# Patient Record
Sex: MALE | Race: WHITE | NOT HISPANIC OR LATINO
[De-identification: names, ages, dates, MRNs, and addresses within clinical notes are randomized per-mention and may not be internally consistent; named-entity substitution may affect disease eponyms.]

---

## 2019-05-21 PROBLEM — Z00.00 ENCOUNTER FOR PREVENTIVE HEALTH EXAMINATION: Status: ACTIVE | Noted: 2019-05-21

## 2019-05-23 ENCOUNTER — APPOINTMENT (OUTPATIENT)
Dept: PHYSICAL MEDICINE AND REHAB | Facility: CLINIC | Age: 44
End: 2019-05-23
Payer: COMMERCIAL

## 2019-05-23 VITALS — WEIGHT: 210 LBS | BODY MASS INDEX: 31.83 KG/M2 | HEART RATE: 73 BPM | RESPIRATION RATE: 15 BRPM | HEIGHT: 68 IN

## 2019-05-23 DIAGNOSIS — Z87.39 PERSONAL HISTORY OF OTHER DISEASES OF THE MUSCULOSKELETAL SYSTEM AND CONNECTIVE TISSUE: ICD-10-CM

## 2019-05-23 DIAGNOSIS — Z80.42 FAMILY HISTORY OF MALIGNANT NEOPLASM OF PROSTATE: ICD-10-CM

## 2019-05-23 DIAGNOSIS — Z82.61 FAMILY HISTORY OF ARTHRITIS: ICD-10-CM

## 2019-05-23 DIAGNOSIS — M54.12 RADICULOPATHY, CERVICAL REGION: ICD-10-CM

## 2019-05-23 DIAGNOSIS — Z80.9 FAMILY HISTORY OF MALIGNANT NEOPLASM, UNSPECIFIED: ICD-10-CM

## 2019-05-23 PROCEDURE — 99204 OFFICE O/P NEW MOD 45 MIN: CPT

## 2019-05-23 RX ORDER — ATORVASTATIN CALCIUM 20 MG/1
20 TABLET, FILM COATED ORAL
Refills: 0 | Status: ACTIVE | COMMUNITY

## 2019-05-23 RX ORDER — ESCITALOPRAM OXALATE 10 MG/1
10 TABLET, FILM COATED ORAL
Refills: 0 | Status: ACTIVE | COMMUNITY

## 2019-05-23 RX ORDER — BACILLUS COAGULANS/INULIN 1B-250 MG
CAPSULE ORAL
Refills: 0 | Status: ACTIVE | COMMUNITY

## 2023-02-05 ENCOUNTER — APPOINTMENT (OUTPATIENT)
Dept: ORTHOPEDIC SURGERY | Facility: CLINIC | Age: 48
End: 2023-02-05
Payer: COMMERCIAL

## 2023-02-05 VITALS — HEIGHT: 68 IN | BODY MASS INDEX: 31.83 KG/M2 | WEIGHT: 210 LBS

## 2023-02-05 PROCEDURE — 73080 X-RAY EXAM OF ELBOW: CPT | Mod: LT

## 2023-02-05 PROCEDURE — 99203 OFFICE O/P NEW LOW 30 MIN: CPT

## 2023-02-05 RX ORDER — MELOXICAM 15 MG/1
15 TABLET ORAL DAILY
Qty: 30 | Refills: 1 | Status: ACTIVE | COMMUNITY
Start: 2023-02-05 | End: 1900-01-01

## 2023-02-05 RX ORDER — LISINOPRIL 30 MG/1
TABLET ORAL
Refills: 0 | Status: ACTIVE | COMMUNITY

## 2023-02-05 RX ORDER — METHYLPREDNISOLONE 4 MG/1
4 TABLET ORAL
Qty: 1 | Refills: 0 | Status: ACTIVE | COMMUNITY
Start: 2023-02-05 | End: 1900-01-01

## 2023-02-05 NOTE — HISTORY OF PRESENT ILLNESS
[9] : 9 [1] : 2 [Sharp] : sharp [de-identified] : 46 y/o RHD M with atraumatic L elbow pain x weeks with worsening when working with hands. pt felt more pain after boxing yesterday. denies numbness/tingling. 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

## 2023-02-05 NOTE — ASSESSMENT
[FreeTextEntry1] : A/P L lateral epicondylitis\par - tennis elbow brace\par - wrist brace\par - activity modification\par - NSAIDs\par - MDP\par - injection discussed\par - natural disease progression discussed\par - f/u 1 week with hand\par

## 2023-02-05 NOTE — IMAGING
[de-identified] : PE L elbow: skin intact, mild swelling, FROM, +tenderness over lateral epicondyle, no medial side pain, pain with resisted WE and supination, NVI distally\par  [Left] : left elbow [There are no fractures, subluxations or dislocations. No significant abnormalities are seen] : There are no fractures, subluxations or dislocations. No significant abnormalities are seen

## 2023-02-10 ENCOUNTER — APPOINTMENT (OUTPATIENT)
Dept: ORTHOPEDIC SURGERY | Facility: CLINIC | Age: 48
End: 2023-02-10
Payer: COMMERCIAL

## 2023-02-10 VITALS — BODY MASS INDEX: 31.83 KG/M2 | HEIGHT: 68 IN | WEIGHT: 210 LBS

## 2023-02-10 DIAGNOSIS — M77.12 LATERAL EPICONDYLITIS, LEFT ELBOW: ICD-10-CM

## 2023-02-10 PROCEDURE — 99244 OFF/OP CNSLTJ NEW/EST MOD 40: CPT | Mod: 25

## 2023-02-10 PROCEDURE — 20551 NJX 1 TENDON ORIGIN/INSJ: CPT

## 2023-02-10 PROCEDURE — J3490M: CUSTOM | Mod: LT

## 2023-02-10 NOTE — ASSESSMENT
[FreeTextEntry1] : L Lateral epicondyle injection was performed because of pain inflammation\par Anesthesia: ethyl chloride sprayed topically\par Celestone: An injection of Celestone 2 cc\par Lidocaine: An injection of Lidocaine 1% 2 cc\par Marcaine: An injection of Marcaine 0.5% 2 cc\par \par Patient has tried OTC's including aspirin, Ibuprofen, Aleve etc or prescription NSAIDS, and/or exercises at home and/ or\par physical therapy without satisfactory response.\par After verbal consent using sterile preparation and technique. The risks, benefits, and alternatives to cortisone injection\par were explained in full to the patient. Risks outlined include but are not limited to infection, sepsis, bleeding, scarring, skin\par discoloration, temporary increase in pain, syncopal episode, failure to resolve symptoms, allergic reaction, symptom\par recurrence, and elevation of blood sugar in diabetics. Patient understood the risks. All questions were answered. After\par discussion of options, patient requested an injection. Oral informed consent was obtained and sterile prep was done of the\par injection site. Sterile technique was utilized for the procedure including the preparation of the solutions used for the\par injection. Patient tolerated the procedure well. Advised to ice the injection site this evening.\par Prep with betadine locally to site. Sterile technique used

## 2023-02-10 NOTE — PHYSICAL EXAM
[de-identified] : L elbow:\par mild swelling\par Tender lateral epicondyle\par Pain with ROM\par Pain with forced wrist extension \par \par Xrays neg

## 2023-02-10 NOTE — HISTORY OF PRESENT ILLNESS
[7] : 7 [6] : 6 [Dull/Aching] : dull/aching [Meds] : meds [Ice] : ice [de-identified] : L elbow pain from hitting heavy bag\par MDP has helped slightly  [] : no [FreeTextEntry1] : L elbow [FreeTextEntry3] : jan 2023 [FreeTextEntry5] : he was hitting punching bag and developed pain overnight [de-identified] : activity  [de-identified] : 2/5/23 [de-identified] : [facility] [facility] [de-identified] : xr

## 2023-04-03 ENCOUNTER — APPOINTMENT (OUTPATIENT)
Dept: ORTHOPEDIC SURGERY | Facility: CLINIC | Age: 48
End: 2023-04-03
Payer: COMMERCIAL

## 2023-04-03 ENCOUNTER — TRANSCRIPTION ENCOUNTER (OUTPATIENT)
Age: 48
End: 2023-04-03

## 2023-04-03 PROCEDURE — 99203 OFFICE O/P NEW LOW 30 MIN: CPT

## 2023-04-03 PROCEDURE — 73610 X-RAY EXAM OF ANKLE: CPT | Mod: RT

## 2023-04-03 NOTE — DISCUSSION/SUMMARY
[de-identified] : The underlying pathophysiology was reviewed with the patient. XR films were reviewed with the patient. Discussed at length the nature of the patient’s condition. The right ankle symptoms appear secondary to a trimalleolar fracture.\par \par At this time, given the nature of the injury as documented above, I recommended operative management of ORIF.\par The patient wishes to proceed with ORIF right ankle, trimalleolar fracture at this time. The risks and benefits were reviewed with the patient. All of his questions were answered. He will meet with our surgical scheduler and be scheduled for surgery possibly tomorrow on 4/4/23.\par \par In the interim, he will remain in the trilaminar splint for support and protection. He was instructed on elevation to reduce edema and on leg lift exercises to maintain quadriceps bulk. I recommended use of Ibuprofen for pain rather than Oxycodone. I also advised baby aspirin 81mg, BID for DVT prophylaxis. \par \par RX: Knee Scooter.

## 2023-04-03 NOTE — HISTORY OF PRESENT ILLNESS
[de-identified] : Pt is a 46 y/o male with right ankle fracture.  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  He stepped out of the truck and landed hard on his right foot.  He heard a crack and felt pain immediately.  He fell to the ground.  He developed swelling and ecchymosis.  He went to [facility] where xrays revealed a right ankle fracture.  He was advised to follow up with a specialist for surgical consultation.  He is currently taking Oxycodone for pain.  He notes he was told not to take Ibuprofen.

## 2023-04-03 NOTE — ADDENDUM
[FreeTextEntry1] : I, Gina Diggs wrote this note acting as a scribe for Dr. Alton Brown on Apr 03, 2023.

## 2023-04-03 NOTE — PHYSICAL EXAM
[de-identified] : Patient is WDWN, alert, and in no acute distress. Breathing is unlabored. He is grossly oriented to person, place, and time.\par \par He is accompanied by his wife today.\par \par Right Ankle:\par He presents to the office NWB, with the assistance of crutches.\par Trilaminar splint in place, which was removed for exam.\par No evidence of blistering or skin breakdown.\par Moderate edema noted to the dorsum of the foot and diffusely through the ankle. \par Ankle ROM was not accessed given injury and pain.\par Full arc of motion to the toes, with intact sensation. [de-identified] : AP, lateral and oblique views of the RIGHT ankle were obtained today and revealed a displaced trimalleolar fracture.

## 2023-04-03 NOTE — END OF VISIT
[FreeTextEntry3] : All medical record entries made by the Scribe were at my,  Dr. Alton Brown MD., direction and personally dictated by me on 04/03/2023. I have personally reviewed the chart and agree that the record accurately reflects my personal performance of the history, physical exam, assessment and plan.

## 2023-04-04 ENCOUNTER — TRANSCRIPTION ENCOUNTER (OUTPATIENT)
Age: 48
End: 2023-04-04

## 2023-04-04 ENCOUNTER — INPATIENT (INPATIENT)
Facility: HOSPITAL | Age: 48
LOS: 0 days | Discharge: HOME | DRG: 494 | End: 2023-04-04
Attending: INTERNAL MEDICINE | Admitting: INTERNAL MEDICINE
Payer: COMMERCIAL

## 2023-04-04 VITALS
OXYGEN SATURATION: 98 % | SYSTOLIC BLOOD PRESSURE: 146 MMHG | HEIGHT: 68 IN | WEIGHT: 220.02 LBS | TEMPERATURE: 99 F | DIASTOLIC BLOOD PRESSURE: 90 MMHG | HEART RATE: 78 BPM | RESPIRATION RATE: 16 BRPM

## 2023-04-04 VITALS
RESPIRATION RATE: 16 BRPM | SYSTOLIC BLOOD PRESSURE: 115 MMHG | TEMPERATURE: 98 F | HEART RATE: 85 BPM | DIASTOLIC BLOOD PRESSURE: 70 MMHG

## 2023-04-04 DIAGNOSIS — Z98.890 OTHER SPECIFIED POSTPROCEDURAL STATES: Chronic | ICD-10-CM

## 2023-04-04 DIAGNOSIS — S82.899A OTHER FRACTURE OF UNSPECIFIED LOWER LEG, INITIAL ENCOUNTER FOR CLOSED FRACTURE: ICD-10-CM

## 2023-04-04 LAB
ALBUMIN SERPL ELPH-MCNC: 3.9 G/DL — SIGNIFICANT CHANGE UP (ref 3.3–5)
ALP SERPL-CCNC: 62 U/L — SIGNIFICANT CHANGE UP (ref 40–120)
ALT FLD-CCNC: 49 U/L — HIGH (ref 10–45)
ANION GAP SERPL CALC-SCNC: 7 MMOL/L — SIGNIFICANT CHANGE UP (ref 5–17)
APTT BLD: 30.7 SEC — SIGNIFICANT CHANGE UP (ref 27.5–35.5)
AST SERPL-CCNC: 25 U/L — SIGNIFICANT CHANGE UP (ref 10–40)
BASOPHILS # BLD AUTO: 0.03 K/UL — SIGNIFICANT CHANGE UP (ref 0–0.2)
BASOPHILS NFR BLD AUTO: 0.4 % — SIGNIFICANT CHANGE UP (ref 0–2)
BILIRUB SERPL-MCNC: 0.5 MG/DL — SIGNIFICANT CHANGE UP (ref 0.2–1.2)
BLD GP AB SCN SERPL QL: SIGNIFICANT CHANGE UP
BUN SERPL-MCNC: 12 MG/DL — SIGNIFICANT CHANGE UP (ref 7–23)
CALCIUM SERPL-MCNC: 9.4 MG/DL — SIGNIFICANT CHANGE UP (ref 8.4–10.5)
CHLORIDE SERPL-SCNC: 103 MMOL/L — SIGNIFICANT CHANGE UP (ref 96–108)
CO2 SERPL-SCNC: 30 MMOL/L — SIGNIFICANT CHANGE UP (ref 22–31)
CREAT SERPL-MCNC: 0.93 MG/DL — SIGNIFICANT CHANGE UP (ref 0.5–1.3)
EGFR: 101 ML/MIN/1.73M2 — SIGNIFICANT CHANGE UP
EOSINOPHIL # BLD AUTO: 0.08 K/UL — SIGNIFICANT CHANGE UP (ref 0–0.5)
EOSINOPHIL NFR BLD AUTO: 1.1 % — SIGNIFICANT CHANGE UP (ref 0–6)
GLUCOSE SERPL-MCNC: 103 MG/DL — HIGH (ref 70–99)
HCT VFR BLD CALC: 41.4 % — SIGNIFICANT CHANGE UP (ref 39–50)
HGB BLD-MCNC: 14.4 G/DL — SIGNIFICANT CHANGE UP (ref 13–17)
IMM GRANULOCYTES NFR BLD AUTO: 0.4 % — SIGNIFICANT CHANGE UP (ref 0–0.9)
INR BLD: 1.07 RATIO — SIGNIFICANT CHANGE UP (ref 0.88–1.16)
LYMPHOCYTES # BLD AUTO: 1.81 K/UL — SIGNIFICANT CHANGE UP (ref 1–3.3)
LYMPHOCYTES # BLD AUTO: 24.3 % — SIGNIFICANT CHANGE UP (ref 13–44)
MCHC RBC-ENTMCNC: 30.8 PG — SIGNIFICANT CHANGE UP (ref 27–34)
MCHC RBC-ENTMCNC: 34.8 GM/DL — SIGNIFICANT CHANGE UP (ref 32–36)
MCV RBC AUTO: 88.5 FL — SIGNIFICANT CHANGE UP (ref 80–100)
MONOCYTES # BLD AUTO: 0.78 K/UL — SIGNIFICANT CHANGE UP (ref 0–0.9)
MONOCYTES NFR BLD AUTO: 10.5 % — SIGNIFICANT CHANGE UP (ref 2–14)
NEUTROPHILS # BLD AUTO: 4.73 K/UL — SIGNIFICANT CHANGE UP (ref 1.8–7.4)
NEUTROPHILS NFR BLD AUTO: 63.3 % — SIGNIFICANT CHANGE UP (ref 43–77)
NRBC # BLD: 0 /100 WBCS — SIGNIFICANT CHANGE UP (ref 0–0)
PLATELET # BLD AUTO: 233 K/UL — SIGNIFICANT CHANGE UP (ref 150–400)
POTASSIUM SERPL-MCNC: 4.5 MMOL/L — SIGNIFICANT CHANGE UP (ref 3.5–5.3)
POTASSIUM SERPL-SCNC: 4.5 MMOL/L — SIGNIFICANT CHANGE UP (ref 3.5–5.3)
PROT SERPL-MCNC: 7.4 G/DL — SIGNIFICANT CHANGE UP (ref 6–8.3)
PROTHROM AB SERPL-ACNC: 12.4 SEC — SIGNIFICANT CHANGE UP (ref 10.5–13.4)
RBC # BLD: 4.68 M/UL — SIGNIFICANT CHANGE UP (ref 4.2–5.8)
RBC # FLD: 12.3 % — SIGNIFICANT CHANGE UP (ref 10.3–14.5)
SARS-COV-2 RNA SPEC QL NAA+PROBE: SIGNIFICANT CHANGE UP
SODIUM SERPL-SCNC: 140 MMOL/L — SIGNIFICANT CHANGE UP (ref 135–145)
WBC # BLD: 7.46 K/UL — SIGNIFICANT CHANGE UP (ref 3.8–10.5)
WBC # FLD AUTO: 7.46 K/UL — SIGNIFICANT CHANGE UP (ref 3.8–10.5)

## 2023-04-04 PROCEDURE — 93010 ELECTROCARDIOGRAM REPORT: CPT

## 2023-04-04 PROCEDURE — C1713: CPT

## 2023-04-04 PROCEDURE — 71045 X-RAY EXAM CHEST 1 VIEW: CPT | Mod: 26

## 2023-04-04 PROCEDURE — C1769: CPT

## 2023-04-04 PROCEDURE — 86850 RBC ANTIBODY SCREEN: CPT

## 2023-04-04 PROCEDURE — 85610 PROTHROMBIN TIME: CPT

## 2023-04-04 PROCEDURE — ZZZZZ: CPT

## 2023-04-04 PROCEDURE — 71045 X-RAY EXAM CHEST 1 VIEW: CPT

## 2023-04-04 PROCEDURE — 99285 EMERGENCY DEPT VISIT HI MDM: CPT | Mod: 25

## 2023-04-04 PROCEDURE — 76000 FLUOROSCOPY <1 HR PHYS/QHP: CPT

## 2023-04-04 PROCEDURE — 99285 EMERGENCY DEPT VISIT HI MDM: CPT

## 2023-04-04 PROCEDURE — 36415 COLL VENOUS BLD VENIPUNCTURE: CPT

## 2023-04-04 PROCEDURE — 86901 BLOOD TYPING SEROLOGIC RH(D): CPT

## 2023-04-04 PROCEDURE — 99223 1ST HOSP IP/OBS HIGH 75: CPT

## 2023-04-04 PROCEDURE — 86900 BLOOD TYPING SEROLOGIC ABO: CPT

## 2023-04-04 PROCEDURE — 85025 COMPLETE CBC W/AUTO DIFF WBC: CPT

## 2023-04-04 PROCEDURE — 93005 ELECTROCARDIOGRAM TRACING: CPT

## 2023-04-04 PROCEDURE — 27823 TREATMENT OF ANKLE FRACTURE: CPT | Mod: RT

## 2023-04-04 PROCEDURE — 85730 THROMBOPLASTIN TIME PARTIAL: CPT

## 2023-04-04 PROCEDURE — 87635 SARS-COV-2 COVID-19 AMP PRB: CPT

## 2023-04-04 PROCEDURE — 80053 COMPREHEN METABOLIC PANEL: CPT

## 2023-04-04 DEVICE — SCREW CORT S-T 3.5X14MM: Type: IMPLANTABLE DEVICE | Status: FUNCTIONAL

## 2023-04-04 DEVICE — SCREW CORT S-T 3.5X16MM: Type: IMPLANTABLE DEVICE | Status: FUNCTIONAL

## 2023-04-04 DEVICE — SCREW CORT S-T 3.5X30MM: Type: IMPLANTABLE DEVICE | Status: FUNCTIONAL

## 2023-04-04 DEVICE — SCREW CORT S-T 3.5X40MM: Type: IMPLANTABLE DEVICE | Status: FUNCTIONAL

## 2023-04-04 DEVICE — SCREW LOKG SLF-TPNG W/ STARDRIVE RECESS 3.5X20MM: Type: IMPLANTABLE DEVICE | Status: FUNCTIONAL

## 2023-04-04 DEVICE — SCREW CORT S-T 3.5X34MM: Type: IMPLANTABLE DEVICE | Status: FUNCTIONAL

## 2023-04-04 DEVICE — PLATE 6 HOLE: Type: IMPLANTABLE DEVICE | Status: FUNCTIONAL

## 2023-04-04 DEVICE — GUIDEWIRE THREADED 1.25 X 150MM: Type: IMPLANTABLE DEVICE | Status: FUNCTIONAL

## 2023-04-04 DEVICE — SCREW CORTEX 3.5X45MM: Type: IMPLANTABLE DEVICE | Status: FUNCTIONAL

## 2023-04-04 DEVICE — IMPLANTABLE DEVICE: Type: IMPLANTABLE DEVICE | Status: FUNCTIONAL

## 2023-04-04 DEVICE — SCREW CORT S-T 3.5X32MM: Type: IMPLANTABLE DEVICE | Status: FUNCTIONAL

## 2023-04-04 DEVICE — PLATE 1/3 TUB LCP W/COLLAR 5H 57MM: Type: IMPLANTABLE DEVICE | Status: FUNCTIONAL

## 2023-04-04 DEVICE — SCREW CORT S-T 3.5X18MM: Type: IMPLANTABLE DEVICE | Status: FUNCTIONAL

## 2023-04-04 RX ORDER — SODIUM CHLORIDE 9 MG/ML
1000 INJECTION, SOLUTION INTRAVENOUS
Refills: 0 | Status: DISCONTINUED | OUTPATIENT
Start: 2023-04-04 | End: 2023-04-04

## 2023-04-04 RX ORDER — OXYCODONE AND ACETAMINOPHEN 5; 325 MG/1; MG/1
2 TABLET ORAL EVERY 6 HOURS
Refills: 0 | Status: DISCONTINUED | OUTPATIENT
Start: 2023-04-04 | End: 2023-04-04

## 2023-04-04 RX ORDER — PANTOPRAZOLE SODIUM 20 MG/1
40 TABLET, DELAYED RELEASE ORAL
Refills: 0 | Status: DISCONTINUED | OUTPATIENT
Start: 2023-04-04 | End: 2023-04-04

## 2023-04-04 RX ORDER — ONDANSETRON 8 MG/1
4 TABLET, FILM COATED ORAL ONCE
Refills: 0 | Status: DISCONTINUED | OUTPATIENT
Start: 2023-04-04 | End: 2023-04-04

## 2023-04-04 RX ORDER — ASPIRIN/CALCIUM CARB/MAGNESIUM 324 MG
1 TABLET ORAL
Refills: 0 | DISCHARGE

## 2023-04-04 RX ORDER — ASPIRIN/CALCIUM CARB/MAGNESIUM 324 MG
81 TABLET ORAL DAILY
Refills: 0 | Status: CANCELLED | OUTPATIENT
Start: 2023-04-05 | End: 2023-04-04

## 2023-04-04 RX ORDER — OXYCODONE HYDROCHLORIDE 5 MG/1
1 TABLET ORAL
Qty: 20 | Refills: 0
Start: 2023-04-04

## 2023-04-04 RX ORDER — ASPIRIN/CALCIUM CARB/MAGNESIUM 324 MG
1 TABLET ORAL
Qty: 84 | Refills: 0
Start: 2023-04-04 | End: 2023-05-15

## 2023-04-04 RX ORDER — ATORVASTATIN CALCIUM 80 MG/1
20 TABLET, FILM COATED ORAL AT BEDTIME
Refills: 0 | Status: DISCONTINUED | OUTPATIENT
Start: 2023-04-04 | End: 2023-04-04

## 2023-04-04 RX ORDER — LISINOPRIL 2.5 MG/1
5 TABLET ORAL DAILY
Refills: 0 | Status: CANCELLED | OUTPATIENT
Start: 2023-04-05 | End: 2023-04-04

## 2023-04-04 RX ORDER — OXYCODONE AND ACETAMINOPHEN 5; 325 MG/1; MG/1
1 TABLET ORAL EVERY 4 HOURS
Refills: 0 | Status: DISCONTINUED | OUTPATIENT
Start: 2023-04-04 | End: 2023-04-04

## 2023-04-04 RX ORDER — ESCITALOPRAM OXALATE 10 MG/1
20 TABLET, FILM COATED ORAL DAILY
Refills: 0 | Status: CANCELLED | OUTPATIENT
Start: 2023-04-05 | End: 2023-04-04

## 2023-04-04 RX ORDER — HYDROMORPHONE HYDROCHLORIDE 2 MG/ML
0.5 INJECTION INTRAMUSCULAR; INTRAVENOUS; SUBCUTANEOUS
Refills: 0 | Status: DISCONTINUED | OUTPATIENT
Start: 2023-04-04 | End: 2023-04-04

## 2023-04-04 NOTE — DISCHARGE NOTE PROVIDER - HOSPITAL COURSE
47 yom pmh of HTN, HLD, Anxiety, GERD, JOHANNA non-complaints with  CPAP, was his usual states of health until Sunday when he fall accidentally from a sports trailer  while stepping down to floor. Since then he had right leg pain. found to have right tibia fracture. Pt underwent ORIF of right ankle fx, tolerated procedure well. Pt to go home with nonweight bearing status on Right lower extremity. Case discussed with attending and deemed stable to be discharged home.

## 2023-04-04 NOTE — BRIEF OPERATIVE NOTE - NSICDXBRIEFPROCEDURE_GEN_ALL_CORE_FT
PROCEDURES:  Open reduction and internal fixation (ORIF) of ankle with fusion 04-Apr-2023 16:38:56 right ankle Soham Cervantes

## 2023-04-04 NOTE — ED ADULT NURSE NOTE - NSIMPLEMENTINTERV_GEN_ALL_ED
Implemented All Universal Safety Interventions:  [place] to call system. Call bell, personal items and telephone within reach. Instruct patient to call for assistance. Room bathroom lighting operational. Non-slip footwear when patient is off stretcher. Physically safe environment: no spills, clutter or unnecessary equipment. Stretcher in lowest position, wheels locked, appropriate side rails in place.

## 2023-04-04 NOTE — DISCHARGE NOTE PROVIDER - CARE PROVIDER_API CALL
Alton Brown)  Orthopaedic Surgery  [address]  [place] [address]  Phone: [phone]  Fax: [phone]  Follow Up Time: 2 weeks

## 2023-04-04 NOTE — DISCHARGE NOTE NURSING/CASE MANAGEMENT/SOCIAL WORK - PATIENT PORTAL LINK FT
You can access the FollowMyHealth Patient Portal offered by [facility] by registering at the following website: [email or web address]. By joining [facility]’s FollowMyHealth portal, you will also be able to view your health information using other applications (apps) compatible with our system.

## 2023-04-04 NOTE — ED ADULT NURSE NOTE - OBJECTIVE STATEMENT
Assumed pt care for a 47 yr old male alert and oriented x4 complaining of an injury on Sunday. As per pt here today for pre op for surgery. Pt denies any further complaints. Awaiting further disposition.

## 2023-04-04 NOTE — DISCHARGE NOTE PROVIDER - NSDCCPTREATMENT_GEN_ALL_CORE_FT
PRINCIPAL PROCEDURE  Procedure: Open reduction and internal fixation (ORIF) of ankle with fusion  Findings and Treatment: right ankle

## 2023-04-04 NOTE — H&P ADULT - HISTORY OF PRESENT ILLNESS
47 yrs old with PMHX of HTN, HLD, Anxiety, GERD, JOHANNA non-complaints with  CPAP, was his usual states of health until Sunday when he fall accidentally from a sports trailer  while stepping down to floor. Since then he had right leg pain. found to have right tibia fracture. seen by  Dr. cMknight. suggested ORIF today. seen by PCP yesterday and got clearance for surgery. currently RLE 5/10 controlled with meds. no other complaints, denied fever/chills/CP/SOB/cough/palpitation/dizziness/abdominal pian/nausea/vomiting/diarrhoea/constipation/dysuria/leg or calf pain/headaches.all other ROS neg. seen by ortho. scheduled for OR at 1 pm

## 2023-04-04 NOTE — H&P ADULT - ASSESSMENT
patient is medically optimized for OR today. full cx to follow   47 yrs old with PMHX of HTN, HLD, Anxiety, GERD, JOHANNA non-complaints with  CPAP, was his usual states of health until Sunday when he fall accidentally from a sports trailer  while stepping down to floor. Since then he had right leg pain. found to have right tibia fracture. seen by  Dr. Mcknight. suggested ORIF today. seen by PCP yesterday and got clearance for surgery. currently RLE 5/10 controlled with meds. no other complaints, denied fever/chills/CP/SOB/cough/palpitation/dizziness/abdominal pian/nausea/vomiting/diarrhoea/constipation/dysuria/leg or calf pain/headaches.all other ROS neg. seen by ortho. scheduled for OR at 1 pm    Accidental fall on sunday  Right proximal tibia fracture    - admit to med/surg  - NPO  - IVF  - for ORIF today. medically optimized. can undergo planned procedure little more than average risk for his h/o GERD, JOHANNA non complaints with CPAP. he has h/o HTN. no h/o DM/CAD/CVA/CKD. FC >4 METS. EKG NSR. cardiac risk low   - Pain control, DVT-P ,    HTN  - c/w Lisinopril 5 mg daily  - DASH once PO    HLD  - c/w lipitor 20  - A1c    Anxiety  - c/w lexapro 20  - mood is currently stable  - no SI    GERD:  - protonix    JOHANNA  - non-complaints with CPAP.   - counselled  - patient does not want to use CPAP    patient will inform his wife    informed  Carmel Fuentes [ ortho] . d/w  [  MD]

## 2023-04-04 NOTE — CONSULT NOTE ADULT - SUBJECTIVE AND OBJECTIVE BOX
HPI:   47 yrs old with PMHX of HTN, HLD, Anxiety, GERD, JOHANNA non-complaints with  CPAP, was his usual states of health until Sunday when he fall accidentally from a sports trailer  while stepping down to floor. Since then he had right leg pain. found to have right tibia fracture. seen by  Dr. Brown. suggested ORIF today. seen by PCP yesterday and got clearance for surgery. currently RLE 5/10 controlled with meds. no other complaints, denied fever/chills/CP/SOB/cough/palpitation/dizziness/abdominal pian/nausea/vomiting/diarrhoea/constipation/dysuria/leg or calf pain/headaches.all other ROS neg. seen by ortho. scheduled for OR at 1 pm (04 Apr 2023 12:23)    The patient was admitted and discussed with Dr Brown.   History of fall as above.   patient is in right leg splint .   He does c/o of pain but it is manageable with pain meds.   He denies any CP or SOB.   Procedure explained to patient and he will have ORIF right ankle this afternoon by Dr Brown.      Vital Signs Last 24 Hrs  T(C): 37.1 (04 Apr 2023 08:31), Max: 37.1 (04 Apr 2023 08:31)  T(F): 98.7 (04 Apr 2023 08:31), Max: 98.7 (04 Apr 2023 08:31)  HR: 78 (04 Apr 2023 13:30) (78 - 78)  BP: 146/90 (04 Apr 2023 13:30) (146/90 - 146/90)  RR: 16 (04 Apr 2023 13:30) (16 - 16)  SpO2: 98% (04 Apr 2023 08:31) (98% - 98%)    Parameters below as of 04 Apr 2023 08:31  Patient On (Oxygen Delivery Method): room air    PAST MEDICAL & SURGICAL HISTORY:  HTN (hypertension)  HLD (hyperlipidemia)  Mild anxiety  Chronic GERD  JOHANNA (obstructive sleep apnea)  No significant past surgical history    MEDICATIONS  (STANDING):    MEDICATIONS  (PRN):    Allergies:  NKA    PE:    alert and oriented X 3  Lungs:  CTA   Cor:  RR s1S2  abd:   soft, non tender , +BS , voiding   Ext:   Right Tri mal fx in splint , toes warm, good CR  left leg wnl                          14.4   7.46  )-----------( 233      ( 04 Apr 2023 10:40 )             41.4   04-04    140  |  103  |  12  ----------------------------<  103<H>  4.5   |  30  |  0.93    Ca    9.4      04 Apr 2023 10:40    TPro  7.4  /  Alb  3.9  /  TBili  0.5  /  DBili  x   /  AST  25  /  ALT  49<H>  /  AlkPhos  62  04-04

## 2023-04-04 NOTE — DISCHARGE NOTE PROVIDER - NSDCFUADDINST_GEN_ALL_CORE_FT
non weight bearing on Right lower extremity  Take doses of Acetaminophen (tylenol) every 6 hours and Ibuprofen (Motrin) every 6 hours, alternating between medications every 3 hours (i.e. Take tylenol and wait 3 hours, then take Motrin and wait 3 hours, repeat) for the first 3-4 days after surgery

## 2023-04-04 NOTE — CONSULT NOTE ADULT - ASSESSMENT
47 year  old  male with PMHX of HTN, HLD, Anxiety, GERD, JOHANNA non-complaints with  CPAP, was his usual states of health until Sunday when he fell accidentally from a sports trailer  while stepping down to floor. Since then he had right leg pain. found to have right tibia fracture. seen by  Dr. Brown. suggested ORIF today. seen by PCP yesterday and got clearance for surgery. currently RLE 5/10 controlled with meds.    Patient admitted and scheduled for ORIF today of right ankle by Dr Brown .  Films reviewed     Plan:   ORIF right ankle             DVT prophylaxis             NON WEIGHT BEARING right leg             Elevate when resting              Pain Management

## 2023-04-04 NOTE — H&P ADULT - NSHPPHYSICALEXAM_GEN_ALL_CORE
Vital Signs Last 24 Hrs  T(C): 37.1 (04 Apr 2023 08:31), Max: 37.1 (04 Apr 2023 08:31)  T(F): 98.7 (04 Apr 2023 08:31), Max: 98.7 (04 Apr 2023 08:31)  HR: 78 (04 Apr 2023 08:31) (78 - 78)  BP: 146/90 (04 Apr 2023 08:31) (146/90 - 146/90)  BP(mean): --  RR: 16 (04 Apr 2023 08:31) (16 - 16)  SpO2: 98% (04 Apr 2023 08:31) (98% - 98%)    Parameters below as of 04 Apr 2023 08:31  Patient On (Oxygen Delivery Method): room air      GENERAL: Not in distress. Alert . obese  HEENT: AT/NC. clear conjuctiva, MMM.   no pallor or icterus  CARDIOVASCULAR: RRR S1, S2. No murmur/rubs/gallop  LUNGS: BLAE+, no rales, no wheezing, no rhonchi.    ABDOMEN: ND. Soft,  NT, no guarding / rebound / rigidity. BS normoactive. No CVA tenderness.    BACK: No spine tenderness.  EXTREMITIES: no edema. no leg or calf TP. RLE in cast/ace wrap  SKIN: no rash. or skin break or ulcer on exposed skin. No cellulitis.  NEUROLOGIC: AAO*3.strength is symmetric, sensation intact, speech fluent.    PSYCHIATRIC: Calm.  No agitation.

## 2023-04-04 NOTE — H&P ADULT - NSICDXFAMILYHX_GEN_ALL_CORE_FT
FAMILY HISTORY:  Father  Still living? Yes, Estimated age: Age Unknown  FH ischemic heart disease, Age at diagnosis: Age Unknown  FH: prostate cancer, Age at diagnosis: Age Unknown    Mother  Still living? No  FH: brain cancer, Age at diagnosis: Age Unknown

## 2023-04-04 NOTE — ED PROVIDER NOTE - OBJECTIVE STATEMENT
Referred by Stephanie for right ankle surgery today.  HO htn, high cholesterol and anxiety.  No other complaints.

## 2023-04-04 NOTE — DISCHARGE NOTE PROVIDER - NSDCMRMEDTOKEN_GEN_ALL_CORE_FT
aspirin 81 mg oral tablet, chewable: 1 tab(s) chewed 2 times a day  Lexapro 20 mg oral tablet: 1 tab(s) orally once a day  Lipitor 20 mg oral tablet: 1 orally once a day (at bedtime)  lisinopril 5 mg oral tablet: 1 tab(s) orally once a day  oxyCODONE 5 mg oral tablet: 1 tab(s) orally every 6 hours as needed for  severe pain MDD: 4

## 2023-04-04 NOTE — H&P ADULT - NSICDXPASTMEDICALHX_GEN_ALL_CORE_FT
PAST MEDICAL HISTORY:  Chronic GERD     HLD (hyperlipidemia)     HTN (hypertension)     Mild anxiety     JOHANNA (obstructive sleep apnea)

## 2023-04-04 NOTE — DISCHARGE NOTE NURSING/CASE MANAGEMENT/SOCIAL WORK - NSDCPEFALRISK_GEN_ALL_CORE
For information on Fall & Injury Prevention, visit: [email or web address] OR  [email or web address] OR  https://www.cdc.gov/steadi/patient.html

## 2023-04-07 PROBLEM — K21.9 GASTRO-ESOPHAGEAL REFLUX DISEASE WITHOUT ESOPHAGITIS: Chronic | Status: ACTIVE | Noted: 2023-04-04

## 2023-04-07 PROBLEM — I10 ESSENTIAL (PRIMARY) HYPERTENSION: Chronic | Status: ACTIVE | Noted: 2023-04-04

## 2023-04-07 PROBLEM — E78.5 HYPERLIPIDEMIA, UNSPECIFIED: Chronic | Status: ACTIVE | Noted: 2023-04-04

## 2023-04-07 PROBLEM — F41.9 ANXIETY DISORDER, UNSPECIFIED: Chronic | Status: ACTIVE | Noted: 2023-04-04

## 2023-04-17 ENCOUNTER — APPOINTMENT (OUTPATIENT)
Dept: ORTHOPEDIC SURGERY | Facility: CLINIC | Age: 48
End: 2023-04-17
Payer: COMMERCIAL

## 2023-04-17 PROCEDURE — 29405 APPL SHORT LEG CAST: CPT | Mod: 58,RT

## 2023-04-17 PROCEDURE — 99024 POSTOP FOLLOW-UP VISIT: CPT

## 2023-04-17 PROCEDURE — 73610 X-RAY EXAM OF ANKLE: CPT | Mod: RT

## 2023-04-17 NOTE — END OF VISIT
[FreeTextEntry3] : All medical record entries made by the Scribe were at my,  Dr. Alton Brown MD., direction and personally dictated by me on 04/17/2023. I have personally reviewed the chart and agree that the record accurately reflects my personal performance of the history, physical exam, assessment and plan.

## 2023-04-17 NOTE — HISTORY OF PRESENT ILLNESS
[de-identified] : s/p Open reduction and internal fixation of right ankle trimalleolar fracture with posterior plating of the posterior malleolus and also application of right short leg cast. [de-identified] : The patient is a 47 year male who returns for the 1st postoperative visit after undergoing Open reduction and internal fixation of right ankle trimalleolar fracture with posterior plating of the  posterior malleolus and also application of right short leg cast at [facility]. The surgery was on 04/04/2023. The patient is recovering at home. He reports mild postoperative pain. He took Ibuprofen and Tylenol initially but is now no longer taking medication for pain. He has been taking baby aspirin 81mg for DVT prophylaxis. [de-identified] : Patient is WDWN, alert, and in no acute distress. Breathing is unlabored. He is grossly oriented to person, place, and time.\par \par He is accompanied by his father.\par \par Right Ankle:\par He presents to the office NWB, with a short leg cast in place. Crutches utilized for assistance. \par The short leg cast was removed for wound check and suture removal.\par Nylon sutures in place, which were removed.\par Incision sites are healing well, without signs of postoperative infection.\par Normal amount of postoperative edema and tenderness noted. [de-identified] : AP, lateral and oblique views of the RIGHT ankle were obtained today and revealed a trimalleolar fracture stabilized by plates and screws. The hardware is well positioned.  [de-identified] : At this time, the sutures were removed. He was placed into a right short leg cast in the office today on 4/17/23. He will remain as NWB while the cast is in place. Proper cast care instructions were reviewed. I recommended leg lift exercises for quadriceps strengthening. He was advised to take Calcium Citrate, Vitamin D3 and Vitamin C to promote bone health and healing. Continue with DVT prophylaxis of 81mg baby aspirin.\par Follow up in 4 weeks for cast removal and repeat xrays.

## 2023-04-17 NOTE — ADDENDUM
[FreeTextEntry1] : I, Gina Diggs wrote this note acting as a scribe for Dr. Alton Brown on Apr 17, 2023.

## 2023-05-15 ENCOUNTER — APPOINTMENT (OUTPATIENT)
Dept: ORTHOPEDIC SURGERY | Facility: CLINIC | Age: 48
End: 2023-05-15
Payer: COMMERCIAL

## 2023-05-15 PROCEDURE — 99024 POSTOP FOLLOW-UP VISIT: CPT

## 2023-05-15 PROCEDURE — 73610 X-RAY EXAM OF ANKLE: CPT | Mod: RT

## 2023-05-15 RX ORDER — OXYCODONE 5 MG/1
5 TABLET ORAL
Qty: 20 | Refills: 0 | Status: ACTIVE | COMMUNITY
Start: 2023-04-04

## 2023-05-15 RX ORDER — ASPIRIN 81 MG/1
81 TABLET, CHEWABLE ORAL
Qty: 84 | Refills: 0 | Status: ACTIVE | COMMUNITY
Start: 2023-04-04

## 2023-05-15 RX ORDER — ALPRAZOLAM 0.5 MG/1
0.5 TABLET ORAL
Qty: 120 | Refills: 0 | Status: ACTIVE | COMMUNITY
Start: 2023-04-03

## 2023-05-15 RX ORDER — ESCITALOPRAM OXALATE 20 MG/1
20 TABLET ORAL
Qty: 30 | Refills: 0 | Status: ACTIVE | COMMUNITY
Start: 2023-02-02

## 2023-05-15 NOTE — END OF VISIT
[FreeTextEntry3] : All medical record entries made by the Scribe were at my,  Dr. Alton Brown MD., direction and personally dictated by me on 05/15/2023. I have personally reviewed the chart and agree that the record accurately reflects my personal performance of the history, physical exam, assessment and plan.

## 2023-05-15 NOTE — HISTORY OF PRESENT ILLNESS
[de-identified] : s/p Open reduction and internal fixation of right ankle trimalleolar fracture with posterior plating of the posterior malleolus and also application of right short leg cast. [de-identified] : The patient is a 48 year male who returns for the 2nd postoperative visit after undergoing Open reduction and internal fixation of right ankle trimalleolar fracture with posterior plating of the  posterior malleolus and also application of right short leg cast at [facility]. The surgery was on 04/04/2023. He returns on 5/15/23 for cast removal and repeat xrays. He is overall well and denies pain. He does however note mild residual burning along the incision sites.  [de-identified] : Patient is WDWN, alert, and in no acute distress. Breathing is unlabored. He is grossly oriented to person, place, and time.\par \par He is accompanied by his father.\par \par Right Ankle:\par He presents to the office NWB, with a short leg cast in place, which was removed today. \par Incision sites are well healed, without signs of postoperative infection.\par Normal amount of postoperative edema and tenderness noted. [de-identified] : AP, lateral and oblique views of the RIGHT ankle were obtained today and revealed a trimalleolar fracture stabilized by plates and screws. The hardware is well positioned. The fracture is perfectly aligned and is healed.  [de-identified] : At this time, the right short leg cast was removed in the office today on 5/15/23. He may begin to WBAT. He was instructed on use of Vitamin E oil on the scar, ROM and strengthening exercises as well as Achilles tendon stretching. I advised low impact activities such as walking to aide in regaining motion and function. Discontinue use of aspirin. \par The patient wishes to proceed with physical therapy of the right ankle (WBAT, ROM, strengthening). A script was given.\par Follow up in 6 weeks for repeat xrays.

## 2023-05-15 NOTE — ADDENDUM
[FreeTextEntry1] : I, Gina Diggs wrote this note acting as a scribe for Dr. Alton Brown on May 15, 2023.

## 2023-06-26 ENCOUNTER — APPOINTMENT (OUTPATIENT)
Dept: ORTHOPEDIC SURGERY | Facility: CLINIC | Age: 48
End: 2023-06-26
Payer: COMMERCIAL

## 2023-06-26 DIAGNOSIS — S82.851A DISPLACED TRIMALLEOLAR FRACTURE OF RIGHT LOWER LEG, INITIAL ENCOUNTER FOR CLOSED FRACTURE: ICD-10-CM

## 2023-06-26 PROCEDURE — 73610 X-RAY EXAM OF ANKLE: CPT | Mod: RT

## 2023-06-26 PROCEDURE — 99024 POSTOP FOLLOW-UP VISIT: CPT

## 2023-06-26 NOTE — END OF VISIT
[FreeTextEntry3] : All medical record entries made by the Scribe were at my,  Dr. Alton Brown MD., direction and personally dictated by me on 06/26/2023. I have personally reviewed the chart and agree that the record accurately reflects my personal performance of the history, physical exam, assessment and plan.

## 2023-06-26 NOTE — ADDENDUM
[FreeTextEntry1] : I, Gina Diggs wrote this note acting as a scribe for Dr. Alton Brown on Jun 26, 2023.

## 2023-06-26 NOTE — HISTORY OF PRESENT ILLNESS
[de-identified] : s/p Open reduction and internal fixation of right ankle trimalleolar fracture with posterior plating of the posterior malleolus and also application of right short leg cast. [de-identified] : The patient is a 48 year male who returns for the 3rd postoperative visit after undergoing Open reduction and internal fixation of right ankle trimalleolar fracture with posterior plating of the  posterior malleolus and also application of right short leg cast at [facility]. The surgery was on 04/04/2023. He returns on 6/26/23 and is progressing. He notes functionally he has not regained full dorsiflexion. He is limited with sports as well as when descending stairs, again due to the limited dorsiflexion. He is not taking pain medication. He utilizes a compression sock regularly. He denies changes or increased symptoms due to weather. He does report mild circulation issues and states the ankle appears purple in the morning. He is still in PT but missed 1 week as there was a delay with his insurance however he started again this past Wednesday. 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  [de-identified] : Patient is WDWN, alert, and in no acute distress. Breathing is unlabored. He is grossly oriented to person, place, and time.\par \par Right Ankle:\par Incision sites are well healed, without signs of postoperative infection.\par Normal amount of postoperative edema and tenderness noted. [de-identified] : AP, lateral and oblique views of the RIGHT ankle were obtained today and revealed a trimalleolar fracture stabilized by plates and screws. The hardware is well positioned. The fracture is perfectly aligned and is healed.  [de-identified] : At this time, I did tell him that maximum improvement will take up to 1 year and I would expect him to continue to improve with time. I told him that the fracture is healed enough that he may push himself, without restrictions. He will continue with physical therapy as well as a supplemental home/gym exercise program. I recommended he continue with low impact aerobic exercise such as walking, swimming and use of a stationary bike. Finally, if after 1 year he has residual symptoms due to the hardware, he does have the option of hardware removal however I told him medically there is no reason as to why the hardware would need to be removed and it is purely elective. \par Follow up in 6 months for repeat xrays.

## 2023-08-17 NOTE — ED ADULT NURSE NOTE - PRIMARY CARE PROVIDER
If you had a biopsy, you should not take aspirin or aspirin like products for the next 10 days unless instructed to do so by your doctor. If you had a biopsy, check with your doctor before taking any blood thinners such as warfarin (Coumadin).
PCP

## 2023-09-21 ENCOUNTER — APPOINTMENT (OUTPATIENT)
Dept: ORTHOPEDIC SURGERY | Facility: CLINIC | Age: 48
End: 2023-09-21
Payer: COMMERCIAL

## 2023-09-21 VITALS — HEIGHT: 68 IN | BODY MASS INDEX: 33.34 KG/M2 | WEIGHT: 220 LBS

## 2023-09-21 PROCEDURE — 73610 X-RAY EXAM OF ANKLE: CPT | Mod: RT

## 2023-09-21 PROCEDURE — 99213 OFFICE O/P EST LOW 20 MIN: CPT

## 2023-12-21 ENCOUNTER — APPOINTMENT (OUTPATIENT)
Dept: ORTHOPEDIC SURGERY | Facility: CLINIC | Age: 48
End: 2023-12-21

## 2024-03-21 ENCOUNTER — APPOINTMENT (OUTPATIENT)
Dept: ORTHOPEDIC SURGERY | Facility: CLINIC | Age: 49
End: 2024-03-21
Payer: COMMERCIAL

## 2024-03-21 VITALS — WEIGHT: 225 LBS | BODY MASS INDEX: 34.1 KG/M2 | HEIGHT: 68 IN

## 2024-03-21 PROCEDURE — 73610 X-RAY EXAM OF ANKLE: CPT | Mod: RT

## 2024-03-21 PROCEDURE — 99213 OFFICE O/P EST LOW 20 MIN: CPT

## 2024-03-28 ENCOUNTER — OUTPATIENT (OUTPATIENT)
Dept: OUTPATIENT SERVICES | Facility: HOSPITAL | Age: 49
LOS: 1 days | End: 2024-03-28
Payer: COMMERCIAL

## 2024-03-28 VITALS
WEIGHT: 233.91 LBS | TEMPERATURE: 98 F | HEART RATE: 72 BPM | HEIGHT: 68 IN | OXYGEN SATURATION: 98 % | DIASTOLIC BLOOD PRESSURE: 82 MMHG | SYSTOLIC BLOOD PRESSURE: 124 MMHG | RESPIRATION RATE: 18 BRPM

## 2024-03-28 DIAGNOSIS — Z01.818 ENCOUNTER FOR OTHER PREPROCEDURAL EXAMINATION: ICD-10-CM

## 2024-03-28 DIAGNOSIS — T84.84XA PAIN DUE TO INTERNAL ORTHOPEDIC PROSTHETIC DEVICES, IMPLANTS AND GRAFTS, INITIAL ENCOUNTER: ICD-10-CM

## 2024-03-28 DIAGNOSIS — Z98.890 OTHER SPECIFIED POSTPROCEDURAL STATES: Chronic | ICD-10-CM

## 2024-03-28 LAB
ALBUMIN SERPL ELPH-MCNC: 4.3 G/DL — SIGNIFICANT CHANGE UP (ref 3.3–5)
ALP SERPL-CCNC: 67 U/L — SIGNIFICANT CHANGE UP (ref 30–120)
ALT FLD-CCNC: 83 U/L — HIGH (ref 10–60)
ANION GAP SERPL CALC-SCNC: 9 MMOL/L — SIGNIFICANT CHANGE UP (ref 5–17)
AST SERPL-CCNC: 34 U/L — SIGNIFICANT CHANGE UP (ref 10–40)
BILIRUB SERPL-MCNC: 0.4 MG/DL — SIGNIFICANT CHANGE UP (ref 0.2–1.2)
BUN SERPL-MCNC: 20 MG/DL — SIGNIFICANT CHANGE UP (ref 7–23)
CALCIUM SERPL-MCNC: 9.1 MG/DL — SIGNIFICANT CHANGE UP (ref 8.4–10.5)
CHLORIDE SERPL-SCNC: 102 MMOL/L — SIGNIFICANT CHANGE UP (ref 96–108)
CO2 SERPL-SCNC: 27 MMOL/L — SIGNIFICANT CHANGE UP (ref 22–31)
CREAT SERPL-MCNC: 1.03 MG/DL — SIGNIFICANT CHANGE UP (ref 0.5–1.3)
EGFR: 89 ML/MIN/1.73M2 — SIGNIFICANT CHANGE UP
GLUCOSE SERPL-MCNC: 105 MG/DL — HIGH (ref 70–99)
HCT VFR BLD CALC: 42.3 % — SIGNIFICANT CHANGE UP (ref 39–50)
HGB BLD-MCNC: 15 G/DL — SIGNIFICANT CHANGE UP (ref 13–17)
MCHC RBC-ENTMCNC: 30.2 PG — SIGNIFICANT CHANGE UP (ref 27–34)
MCHC RBC-ENTMCNC: 35.5 GM/DL — SIGNIFICANT CHANGE UP (ref 32–36)
MCV RBC AUTO: 85.1 FL — SIGNIFICANT CHANGE UP (ref 80–100)
NRBC # BLD: 0 /100 WBCS — SIGNIFICANT CHANGE UP (ref 0–0)
PLATELET # BLD AUTO: 251 K/UL — SIGNIFICANT CHANGE UP (ref 150–400)
POTASSIUM SERPL-MCNC: 4.1 MMOL/L — SIGNIFICANT CHANGE UP (ref 3.5–5.3)
POTASSIUM SERPL-SCNC: 4.1 MMOL/L — SIGNIFICANT CHANGE UP (ref 3.5–5.3)
PROT SERPL-MCNC: 7.5 G/DL — SIGNIFICANT CHANGE UP (ref 6–8.3)
RBC # BLD: 4.97 M/UL — SIGNIFICANT CHANGE UP (ref 4.2–5.8)
RBC # FLD: 12 % — SIGNIFICANT CHANGE UP (ref 10.3–14.5)
SODIUM SERPL-SCNC: 138 MMOL/L — SIGNIFICANT CHANGE UP (ref 135–145)
WBC # BLD: 6.5 K/UL — SIGNIFICANT CHANGE UP (ref 3.8–10.5)
WBC # FLD AUTO: 6.5 K/UL — SIGNIFICANT CHANGE UP (ref 3.8–10.5)

## 2024-03-28 PROCEDURE — 85027 COMPLETE CBC AUTOMATED: CPT

## 2024-03-28 PROCEDURE — 93010 ELECTROCARDIOGRAM REPORT: CPT

## 2024-03-28 PROCEDURE — 36415 COLL VENOUS BLD VENIPUNCTURE: CPT

## 2024-03-28 PROCEDURE — 80053 COMPREHEN METABOLIC PANEL: CPT

## 2024-03-28 PROCEDURE — G0463: CPT

## 2024-03-28 PROCEDURE — 93005 ELECTROCARDIOGRAM TRACING: CPT

## 2024-03-28 NOTE — H&P PST ADULT - GENITOURINARY
Problem: Patient Care Overview  Goal: Individualization & Mutuality  Plan of care discussed with patient.  Patient ambulating with assistance, fall precautions in place. LVAD DP and numbers WNL, smooth LVAD hum. Patient has no complaints of pain. Discussed medications and care. Heparin gtt D/C, INR=2.0. VSS. AAOx4.  GLENN completed today. Patient has no questions at this time. Will continue to monitor.           details…

## 2024-03-28 NOTE — H&P PST ADULT - NSICDXPROCEDURE_GEN_ALL_CORE_FT
PROCEDURES:  Removal of screw from left tibia and fibula 28-Mar-2024 07:23:46 removal of right distal tibia fibula Brina Fernandez

## 2024-03-28 NOTE — H&P PST ADULT - ASSESSMENT
47 y/o male is scheduled for removal of right distal Tibia fibula implant  pre-op instructions provided  Will obtain medical clearance  Instructions provided on medications to continue and to take the day morning of surgery

## 2024-03-28 NOTE — H&P PST ADULT - HISTORY OF PRESENT ILLNESS
49 y/o with PMHX of HTN, HLD, Anxiety, GERD, JOHANNA had an accidental fall and fracture. He has undergone ORIF tibia and fibula on 04/23. Patient complains have  pain with ROM 6/10.He is not taking anything for pain. Patient is scheduled for removal of right distal Tibia fibula synthesis 49 y/o with PMHX of HTN, HLD, Anxiety, GERD, JOHANNA had an accidental fall and fracture. He has undergone ORIF tibia and fibula on 04/23. Patient complains limited with sports as well as when descending stairs 6/10 on pain scale. He is not taking anything for pain. Patient is scheduled for removal of right distal Tibia fibula synthesis.

## 2024-04-11 ENCOUNTER — TRANSCRIPTION ENCOUNTER (OUTPATIENT)
Age: 49
End: 2024-04-11

## 2024-04-12 ENCOUNTER — OUTPATIENT (OUTPATIENT)
Dept: OUTPATIENT SERVICES | Facility: HOSPITAL | Age: 49
LOS: 1 days | End: 2024-04-12
Payer: COMMERCIAL

## 2024-04-12 ENCOUNTER — APPOINTMENT (OUTPATIENT)
Dept: ORTHOPEDIC SURGERY | Facility: HOSPITAL | Age: 49
End: 2024-04-12

## 2024-04-12 ENCOUNTER — TRANSCRIPTION ENCOUNTER (OUTPATIENT)
Age: 49
End: 2024-04-12

## 2024-04-12 VITALS
SYSTOLIC BLOOD PRESSURE: 130 MMHG | TEMPERATURE: 97 F | HEART RATE: 87 BPM | OXYGEN SATURATION: 96 % | DIASTOLIC BLOOD PRESSURE: 76 MMHG | RESPIRATION RATE: 12 BRPM

## 2024-04-12 VITALS
DIASTOLIC BLOOD PRESSURE: 85 MMHG | HEIGHT: 68 IN | RESPIRATION RATE: 13 BRPM | SYSTOLIC BLOOD PRESSURE: 139 MMHG | HEART RATE: 75 BPM | WEIGHT: 230.6 LBS | OXYGEN SATURATION: 100 % | TEMPERATURE: 97 F

## 2024-04-12 DIAGNOSIS — Z98.890 OTHER SPECIFIED POSTPROCEDURAL STATES: Chronic | ICD-10-CM

## 2024-04-12 DIAGNOSIS — T84.84XA PAIN DUE TO INTERNAL ORTHOPEDIC PROSTHETIC DEVICES, IMPLANTS AND GRAFTS, INITIAL ENCOUNTER: ICD-10-CM

## 2024-04-12 PROCEDURE — 20680 REMOVAL OF IMPLANT DEEP: CPT | Mod: XS

## 2024-04-12 PROCEDURE — 20680 REMOVAL OF IMPLANT DEEP: CPT | Mod: RT

## 2024-04-12 PROCEDURE — 97161 PT EVAL LOW COMPLEX 20 MIN: CPT

## 2024-04-12 PROCEDURE — 76000 FLUOROSCOPY <1 HR PHYS/QHP: CPT

## 2024-04-12 RX ORDER — ESCITALOPRAM OXALATE 10 MG/1
1 TABLET, FILM COATED ORAL
Refills: 0 | DISCHARGE

## 2024-04-12 RX ORDER — APREPITANT 80 MG/1
40 CAPSULE ORAL ONCE
Refills: 0 | Status: COMPLETED | OUTPATIENT
Start: 2024-04-12 | End: 2024-04-12

## 2024-04-12 RX ORDER — CEFAZOLIN SODIUM 1 G
2000 VIAL (EA) INJECTION ONCE
Refills: 0 | Status: COMPLETED | OUTPATIENT
Start: 2024-04-12 | End: 2024-04-12

## 2024-04-12 RX ORDER — ASPIRIN/CALCIUM CARB/MAGNESIUM 324 MG
0 TABLET ORAL
Refills: 0 | DISCHARGE

## 2024-04-12 RX ORDER — HYDROMORPHONE HYDROCHLORIDE 2 MG/ML
0.5 INJECTION INTRAMUSCULAR; INTRAVENOUS; SUBCUTANEOUS
Refills: 0 | Status: DISCONTINUED | OUTPATIENT
Start: 2024-04-12 | End: 2024-04-12

## 2024-04-12 RX ORDER — CHLORHEXIDINE GLUCONATE 213 G/1000ML
1 SOLUTION TOPICAL ONCE
Refills: 0 | Status: COMPLETED | OUTPATIENT
Start: 2024-04-12 | End: 2024-04-12

## 2024-04-12 RX ORDER — OXYCODONE HYDROCHLORIDE 5 MG/1
5 TABLET ORAL ONCE
Refills: 0 | Status: DISCONTINUED | OUTPATIENT
Start: 2024-04-12 | End: 2024-04-12

## 2024-04-12 RX ORDER — ONDANSETRON 8 MG/1
4 TABLET, FILM COATED ORAL ONCE
Refills: 0 | Status: DISCONTINUED | OUTPATIENT
Start: 2024-04-12 | End: 2024-04-12

## 2024-04-12 RX ORDER — HYDROMORPHONE HYDROCHLORIDE 2 MG/ML
1 INJECTION INTRAMUSCULAR; INTRAVENOUS; SUBCUTANEOUS
Refills: 0 | Status: DISCONTINUED | OUTPATIENT
Start: 2024-04-12 | End: 2024-04-12

## 2024-04-12 RX ORDER — OMEPRAZOLE 10 MG/1
1 CAPSULE, DELAYED RELEASE ORAL
Refills: 0 | DISCHARGE

## 2024-04-12 RX ORDER — ATORVASTATIN CALCIUM 80 MG/1
1 TABLET, FILM COATED ORAL
Refills: 0 | DISCHARGE

## 2024-04-12 RX ORDER — OXYCODONE AND ACETAMINOPHEN 5; 325 MG/1; MG/1
1 TABLET ORAL
Qty: 10 | Refills: 0
Start: 2024-04-12

## 2024-04-12 RX ORDER — SODIUM CHLORIDE 9 MG/ML
1000 INJECTION, SOLUTION INTRAVENOUS
Refills: 0 | Status: DISCONTINUED | OUTPATIENT
Start: 2024-04-12 | End: 2024-04-12

## 2024-04-12 RX ORDER — IBUPROFEN 200 MG
1 TABLET ORAL
Qty: 30 | Refills: 0
Start: 2024-04-12

## 2024-04-12 RX ORDER — LISINOPRIL 2.5 MG/1
1 TABLET ORAL
Refills: 0 | DISCHARGE

## 2024-04-12 RX ADMIN — OXYCODONE HYDROCHLORIDE 5 MILLIGRAM(S): 5 TABLET ORAL at 15:20

## 2024-04-12 RX ADMIN — CHLORHEXIDINE GLUCONATE 1 APPLICATION(S): 213 SOLUTION TOPICAL at 12:08

## 2024-04-12 RX ADMIN — APREPITANT 40 MILLIGRAM(S): 80 CAPSULE ORAL at 12:07

## 2024-04-12 RX ADMIN — SODIUM CHLORIDE 75 MILLILITER(S): 9 INJECTION, SOLUTION INTRAVENOUS at 15:11

## 2024-04-12 NOTE — ASU PATIENT PROFILE, ADULT - NSICDXPASTMEDICALHX_GEN_ALL_CORE_FT
PAST MEDICAL HISTORY:  Chronic GERD     HLD (hyperlipidemia)     HTN (hypertension)     Mild anxiety     JOHANNA (obstructive sleep apnea) uses CPAP  sometimes

## 2024-04-12 NOTE — BRIEF OPERATIVE NOTE - NSICDXBRIEFPROCEDURE_GEN_ALL_CORE_FT
PROCEDURES:  Removal of superficial hardware from foot or ankle 12-Apr-2024 12:14:52  Martin Murcia

## 2024-04-12 NOTE — BRIEF OPERATIVE NOTE - NSICDXBRIEFPREOP_GEN_ALL_CORE_FT
PRE-OP DIAGNOSIS:  Pain due to internal orthopedic prosthetic device 12-Apr-2024 12:15:46  Martin Murcia

## 2024-04-12 NOTE — ASU DISCHARGE PLAN (ADULT/PEDIATRIC) - ACTIVITY LEVEL
No exercise/No heavy lifting/No sports/gym No exercise/No heavy lifting/No sports/gym/Weight bearing as tolerated/Elevate extremity No exercise/No heavy lifting/No sports/gym/Weight bearing as tolerated/Elevate extremity/No tub baths

## 2024-04-12 NOTE — ASU DISCHARGE PLAN (ADULT/PEDIATRIC) - CALL YOUR DOCTOR IF YOU HAVE ANY OF THE FOLLOWING:
Bleeding that does not stop/Swelling that gets worse/Pain not relieved by Medications/Fever greater than (need to indicate Fahrenheit or Celsius)/Numbness, tingling, color or temperature change to extremity Bleeding that does not stop/Swelling that gets worse/Pain not relieved by Medications/Fever greater than (need to indicate Fahrenheit or Celsius)/Wound/Surgical Site with redness, or foul smelling discharge or pus/Numbness, tingling, color or temperature change to extremity/Nausea and vomiting that does not stop/Inability to tolerate liquids or foods/Increased irritability or sluggishness

## 2024-04-12 NOTE — ASU DISCHARGE PLAN (ADULT/PEDIATRIC) - CARE PROVIDER_API CALL
Alton Brown  Orthopaedic Surgery  [address], [address]  [place] [address]  Phone: [phone]  Fax: [phone]  Follow Up Time:

## 2024-04-12 NOTE — ASU DISCHARGE PLAN (ADULT/PEDIATRIC) - ASU DC SPECIAL INSTRUCTIONSFT
DO NOT wet or remove the splint/dressing.  Elevate the extremity to reduce swelling.  No strenuous activities or heavy lifting.    Please follow Dr. Brown's instructions.    Follow up in the office in 10 days.  Please call to confirm the appointment. DO NOT wet or remove the dressing.  Elevate the extremity to reduce swelling.  No strenuous activities or heavy lifting.    Please follow Dr. Brown's instructions.    Follow up in the office in 10 days.  Please call to confirm the appointment.

## 2024-04-12 NOTE — ASU PATIENT PROFILE, ADULT - FALL HARM RISK - UNIVERSAL INTERVENTIONS
Bed in lowest position, wheels locked, appropriate side rails in place/Call bell, personal items and telephone in reach/Instruct patient to call for assistance before getting out of bed or chair/Non-slip footwear when patient is out of bed/[place] to call system/Physically safe environment - no spills, clutter or unnecessary equipment/Purposeful Proactive Rounding/Room/bathroom lighting operational, light cord in reach

## 2024-04-12 NOTE — BRIEF OPERATIVE NOTE - NSICDXBRIEFPOSTOP_GEN_ALL_CORE_FT
POST-OP DIAGNOSIS:  Pain due to internal orthopedic prosthetic device 12-Apr-2024 12:15:52  Martin Murcia

## 2024-04-15 PROBLEM — G47.33 OBSTRUCTIVE SLEEP APNEA (ADULT) (PEDIATRIC): Chronic | Status: ACTIVE | Noted: 2023-04-04

## 2024-04-22 ENCOUNTER — APPOINTMENT (OUTPATIENT)
Dept: ORTHOPEDIC SURGERY | Facility: CLINIC | Age: 49
End: 2024-04-22
Payer: COMMERCIAL

## 2024-04-22 VITALS — BODY MASS INDEX: 34.56 KG/M2 | HEIGHT: 68 IN | WEIGHT: 228 LBS

## 2024-04-22 PROCEDURE — 73610 X-RAY EXAM OF ANKLE: CPT | Mod: RT

## 2024-04-22 PROCEDURE — 99024 POSTOP FOLLOW-UP VISIT: CPT

## 2024-04-22 NOTE — ADDENDUM
[FreeTextEntry1] :  I, Sherine Gray wrote this note acting as a scribe for Dr. Alton Brown on Apr 22, 2024.

## 2024-04-22 NOTE — END OF VISIT
[FreeTextEntry3] :  All medical record entries made by the Scribe were at my,  Dr. Alton Brown MD., direction and personally dictated by me on 04/22/2024. I have personally reviewed the chart and agree that the record accurately reflects my personal performance of the history, physical exam, assessment and plan.

## 2024-04-22 NOTE — HISTORY OF PRESENT ILLNESS
[de-identified] : 1st POA s/p Removal of hardware, right distal fibula plate and screws. Removal of hardware, right distal tibia posterior plate and screws. Removal of hardware, right distal tibia medial malleolus with washout. [de-identified] : The patient is a 49 year male who returns for the 1st postoperative visit after undergoing Removal of hardware, right distal fibula plate and screws. Removal of hardware, right distal tibia posterior plate and screws. Removal of hardware, right distal tibia medial malleolus with washout  at [facility]. The surgery was on 04/12/2024. The patient is recovering at home. He reports mild postoperative pain. [de-identified] : Patient is WDWN, alert, and in no acute distress. Breathing is unlabored. He is grossly oriented to person, place, and time.   Right Ankle:   Incision is healing well. There are no signs of infection. Sutures were removed. Normal amount of postoperative edema and tenderness present. [de-identified] :  AP, lateral and oblique views of the Right Ankle were obtained today and revealed residual space from the hardware removal.  [de-identified] :  The underlying pathophysiology was reviewed with the patient. XR films were reviewed with the patient. Discussed at length the nature of the patient's condition.   Patient was advised to take OTC medications and topical analgesic for pain management.  The sutures were removed. The patient was instructed to keep the incision site dry for 14 days from the date of surgery and steri strips were put in place. They may then begin to massage the scar with vitamin E oil. Gentle range of motion, stretching, and strengthening exercises were encouraged. Patient should actively work on gradually increasing use of the ankle, as tolerated. Ace bandage was wrapped on the ankle.   The patient was encouraged to take Calcium Citrate, Vitamin D3 and Vitamin C along with a high calcium diet is advised to promote bone health.   All questions answered, understanding verbalized. Patient in agreement with plan of care.   Patient was advised to follow up in 6 weeks.

## 2024-06-03 ENCOUNTER — APPOINTMENT (OUTPATIENT)
Dept: ORTHOPEDIC SURGERY | Facility: CLINIC | Age: 49
End: 2024-06-03
Payer: COMMERCIAL

## 2024-06-03 VITALS — HEIGHT: 68 IN | BODY MASS INDEX: 34.56 KG/M2 | WEIGHT: 228 LBS

## 2024-06-03 DIAGNOSIS — S82.891A OTHER FRACTURE OF RIGHT LOWER LEG, INITIAL ENCOUNTER FOR CLOSED FRACTURE: ICD-10-CM

## 2024-06-03 PROCEDURE — 73610 X-RAY EXAM OF ANKLE: CPT | Mod: RT

## 2024-06-03 PROCEDURE — 99024 POSTOP FOLLOW-UP VISIT: CPT

## 2024-06-03 NOTE — END OF VISIT
[FreeTextEntry3] :  All medical record entries made by the Scribe were at my,  Dr. Alton Brown MD., direction and personally dictated by me on 06/03/2024. I have personally reviewed the chart and agree that the record accurately reflects my personal performance of the history, physical exam, assessment and plan.

## 2024-06-03 NOTE — HISTORY OF PRESENT ILLNESS
[de-identified] : 2nd POA s/p Removal of hardware, right distal fibula plate and screws. Removal of hardware, right distal tibia posterior plate and screws. Removal of hardware, right distal tibia medial malleolus with washout. [de-identified] : The patient is a 49 year male who returns for the 2nd postoperative visit after undergoing Removal of hardware, right distal fibula plate and screws. Removal of hardware, right distal tibia posterior plate and screws. Removal of hardware, right distal tibia medial malleolus with washout  at [facility]. The surgery was on 04/12/2024. The patient is recovering at home. He reports mild postoperative pain. [de-identified] : Patient is WDWN, alert, and in no acute distress. Breathing is unlabored. He is grossly oriented to person, place, and time.   Right Ankle:   Incision is healing well. There are no signs of infection. Normal amount of postoperative edema and tenderness present. [de-identified] :  AP, lateral and oblique views of the Right Ankle were obtained today and revealed residual space from the hardware removal.  [de-identified] :  The underlying pathophysiology was reviewed with the patient. XR films were reviewed with the patient. Discussed at length the nature of the patient's condition.   Patient was advised to take OTC medications and topical analgesic for pain management.  The patient was encouraged to take Calcium Citrate, Vitamin D3 and Vitamin C along with a high calcium diet is advised to promote bone health and healing for the residual space from the hardware removal.   All questions answered, understanding verbalized. Patient in agreement with plan of care.   Patient was advised to follow up in 6 weeks or as needed.

## 2024-06-03 NOTE — ADDENDUM
[FreeTextEntry1] :  I, Sherine Gray wrote this note acting as a scribe for Dr. Alton Brown on Jun 03, 2024.

## 2025-03-11 ENCOUNTER — APPOINTMENT (OUTPATIENT)
Dept: ORTHOPEDIC SURGERY | Facility: CLINIC | Age: 50
End: 2025-03-11
Payer: COMMERCIAL

## 2025-03-11 VITALS — HEIGHT: 68 IN | BODY MASS INDEX: 33.34 KG/M2 | WEIGHT: 220 LBS

## 2025-03-11 DIAGNOSIS — M62.838 OTHER MUSCLE SPASM: ICD-10-CM

## 2025-03-11 DIAGNOSIS — M25.511 PAIN IN RIGHT SHOULDER: ICD-10-CM

## 2025-03-11 PROCEDURE — 99203 OFFICE O/P NEW LOW 30 MIN: CPT

## 2025-03-11 PROCEDURE — 99213 OFFICE O/P EST LOW 20 MIN: CPT

## 2025-03-11 PROCEDURE — 73030 X-RAY EXAM OF SHOULDER: CPT | Mod: RT

## 2025-03-11 PROCEDURE — 72040 X-RAY EXAM NECK SPINE 2-3 VW: CPT

## 2025-03-11 RX ORDER — CYCLOBENZAPRINE HYDROCHLORIDE 5 MG/1
5 TABLET, FILM COATED ORAL
Qty: 20 | Refills: 0 | Status: ACTIVE | COMMUNITY
Start: 2025-03-11 | End: 1900-01-01

## 2025-03-11 RX ORDER — OMEPRAZOLE 20 MG/1
TABLET, ORALLY DISINTEGRATING, DELAYED RELEASE ORAL
Refills: 0 | Status: ACTIVE | COMMUNITY

## 2025-03-11 RX ORDER — METHYLPREDNISOLONE 4 MG/1
4 TABLET ORAL
Qty: 1 | Refills: 0 | Status: ACTIVE | COMMUNITY
Start: 2025-03-11 | End: 1900-01-01

## 2025-03-19 ENCOUNTER — APPOINTMENT (OUTPATIENT)
Dept: ORTHOPEDIC SURGERY | Facility: CLINIC | Age: 50
End: 2025-03-19
Payer: COMMERCIAL

## 2025-03-19 DIAGNOSIS — M54.2 CERVICALGIA: ICD-10-CM

## 2025-03-19 DIAGNOSIS — S13.9XXA SPRAIN OF JOINTS AND LIGAMENTS OF UNSPECIFIED PARTS OF NECK, INITIAL ENCOUNTER: ICD-10-CM

## 2025-03-19 DIAGNOSIS — M54.12 RADICULOPATHY, CERVICAL REGION: ICD-10-CM

## 2025-03-19 PROCEDURE — 99244 OFF/OP CNSLTJ NEW/EST MOD 40: CPT

## 2025-03-19 RX ORDER — GABAPENTIN 300 MG/1
300 CAPSULE ORAL 3 TIMES DAILY
Qty: 90 | Refills: 0 | Status: ACTIVE | COMMUNITY
Start: 2025-03-19 | End: 1900-01-01

## 2025-03-20 ENCOUNTER — APPOINTMENT (OUTPATIENT)
Dept: MRI IMAGING | Facility: CLINIC | Age: 50
End: 2025-03-20

## 2025-03-22 ENCOUNTER — APPOINTMENT (OUTPATIENT)
Dept: MRI IMAGING | Facility: CLINIC | Age: 50
End: 2025-03-22
Payer: COMMERCIAL

## 2025-03-22 PROCEDURE — 72141 MRI NECK SPINE W/O DYE: CPT

## 2025-04-02 ENCOUNTER — APPOINTMENT (OUTPATIENT)
Dept: ORTHOPEDIC SURGERY | Facility: CLINIC | Age: 50
End: 2025-04-02

## 2025-04-02 DIAGNOSIS — M54.2 CERVICALGIA: ICD-10-CM

## 2025-04-03 RX ORDER — CELECOXIB 100 MG/1
100 CAPSULE ORAL
Qty: 60 | Refills: 0 | Status: ACTIVE | COMMUNITY
Start: 2025-04-03 | End: 1900-01-01

## 2025-04-07 ENCOUNTER — APPOINTMENT (OUTPATIENT)
Dept: PAIN MANAGEMENT | Facility: CLINIC | Age: 50
End: 2025-04-07

## 2025-04-07 VITALS — WEIGHT: 220 LBS | BODY MASS INDEX: 33.34 KG/M2 | HEIGHT: 68 IN

## 2025-04-07 DIAGNOSIS — M54.12 RADICULOPATHY, CERVICAL REGION: ICD-10-CM

## 2025-04-07 PROCEDURE — 99203 OFFICE O/P NEW LOW 30 MIN: CPT

## 2025-04-16 ENCOUNTER — APPOINTMENT (OUTPATIENT)
Dept: ORTHOPEDIC SURGERY | Facility: CLINIC | Age: 50
End: 2025-04-16

## 2025-04-22 ENCOUNTER — APPOINTMENT (OUTPATIENT)
Age: 50
End: 2025-04-22
Payer: COMMERCIAL

## 2025-04-22 PROCEDURE — 62321 NJX INTERLAMINAR CRV/THRC: CPT

## 2025-04-23 ENCOUNTER — APPOINTMENT (OUTPATIENT)
Dept: PAIN MANAGEMENT | Facility: CLINIC | Age: 50
End: 2025-04-23

## 2025-04-30 ENCOUNTER — APPOINTMENT (OUTPATIENT)
Dept: ORTHOPEDIC SURGERY | Facility: CLINIC | Age: 50
End: 2025-04-30

## 2025-05-12 ENCOUNTER — APPOINTMENT (OUTPATIENT)
Dept: PAIN MANAGEMENT | Facility: CLINIC | Age: 50
End: 2025-05-12
Payer: COMMERCIAL

## 2025-05-12 VITALS — WEIGHT: 220 LBS | HEIGHT: 68 IN | BODY MASS INDEX: 33.34 KG/M2

## 2025-05-12 DIAGNOSIS — M54.12 RADICULOPATHY, CERVICAL REGION: ICD-10-CM

## 2025-05-12 PROCEDURE — 99213 OFFICE O/P EST LOW 20 MIN: CPT

## (undated) DEVICE — DRAPE 3/4 SHEET 52X76"

## (undated) DEVICE — DRAPE INSTRUMENT POUCH 6.75" X 11"

## (undated) DEVICE — SUT POLYSORB 2-0 30" GS-11 UNDYED

## (undated) DEVICE — SUT MONOSOF 3-0 30" C-16

## (undated) DEVICE — DRAPE C ARM C-ARMOUR

## (undated) DEVICE — DRAPE C ARM UNIVERSAL

## (undated) DEVICE — POSITIONER FOAM HEAD CRADLE (PINK)

## (undated) DEVICE — DRILL BIT SYNTHES ORTHO QC 2.5X110MM

## (undated) DEVICE — DRSG SPLINT ROLL ORTHO GLASS SYSTEM 4"X15'

## (undated) DEVICE — WARMING BLANKET FULL ADULT

## (undated) DEVICE — POSITIONER STRAP ARMBOARD VELCRO TS-30

## (undated) DEVICE — DRSG WEBRIL 4"

## (undated) DEVICE — PREP CHLORAPREP HI-LITE ORANGE 26ML

## (undated) DEVICE — VENODYNE/SCD SLEEVE CALF MEDIUM

## (undated) DEVICE — GLV 8 PROTEXIS (BLUE)

## (undated) DEVICE — DRSG STERISTRIPS 0.5 X 4"

## (undated) DEVICE — TUBING SUCTION 20FT

## (undated) DEVICE — SOL IRR POUR H2O 1500ML

## (undated) DEVICE — DRAPE C ARM 41X140"

## (undated) DEVICE — DRSG WEBRIL 6"

## (undated) DEVICE — WARMING BLANKET UPPER ADULT

## (undated) DEVICE — DRILL BIT SYNTHES ORTHO CANN QC 2.7X160MM

## (undated) DEVICE — DRAPE TOWEL BLUE 17" X 24"

## (undated) DEVICE — SOLIDIFIER CANN EXPRESS 3K

## (undated) DEVICE — GLV 7.5 PROTEXIS (WHITE)

## (undated) DEVICE — TOURNIQUET ESMARK 6"

## (undated) DEVICE — SUT VICRYL 3-0 27" RB-1 UNDYED

## (undated) DEVICE — CANISTER SUCTION LID GUARD 3000CC

## (undated) DEVICE — SOL IRR POUR NS 0.9% 500ML

## (undated) DEVICE — GLV 8 PROTEXIS (WHITE)

## (undated) DEVICE — GLV 6.5 PROTEXIS (WHITE)

## (undated) DEVICE — ELCTR STRYKER NEPTUNE SMOKE EVACUATION PENCIL (GREEN)

## (undated) DEVICE — DRAPE TOP SHEET 53" X 101"

## (undated) DEVICE — PACK LOWER EXTREMITY

## (undated) DEVICE — SUT MONOCRYL 4-0 18" P-3 UNDYED